# Patient Record
(demographics unavailable — no encounter records)

---

## 2025-03-27 NOTE — REVIEW OF SYSTEMS
[Heartburn] : heartburn [Bloating (gassiness)] : bloating [Negative] : Heme/Lymph [FreeTextEntry7] : Nausea Early satiety

## 2025-03-27 NOTE — HISTORY OF PRESENT ILLNESS
[FreeTextEntry1] : He is a 20-year-old male with a long history of getting filled up with less food which is worse recently.  He denies abdominal pain.  He admits to occasional nausea and heartburn.  He also admits to increased gas with abdominal bloating.  He denies a family history of gastric cancer.  He denies a history of diabetes.  Denies weight loss.

## 2025-03-27 NOTE — CONSULT LETTER
[Dear  ___] : Dear  [unfilled], [Consult Letter:] : I had the pleasure of evaluating your patient, [unfilled]. [( Thank you for referring [unfilled] for consultation for _____ )] : Thank you for referring [unfilled] for consultation for [unfilled] [Please see my note below.] : Please see my note below. [Consult Closing:] : Thank you very much for allowing me to participate in the care of this patient.  If you have any questions, please do not hesitate to contact me. [Sincerely,] : Sincerely, [FreeTextEntry3] : Abhijit Le MD  Gastroenterology [facility] / [facility]

## 2025-03-27 NOTE — ASSESSMENT
[FreeTextEntry1] : Most likely he has irritable bowel syndrome with gastroesophageal reflux disease.  We must also rule out gastroparesis  EILEEN BIRMINGHAM was advised to undergo endoscopy to which he agreed. The procedure will be performed in [facility] with the assistance of an anesthesiologist. He was given a booklet distributed by the [employer] explaining the procedure in detail and he understood the risks of the procedure not limited to infection, bleeding, perforation or non- diagnosis of gastric or esophageal cancer.  He was advised that he could not drive home, if he chooses to receive sedation. Further diagnostic and treatment recommendations will be based upon the procedure and any biopsies, if they are taken. Thank you for allowing me to participate in this [facility] care.   I spent 34  minutes with the patient and answered all of his questions and explained endoscopy to him in detail